# Patient Record
Sex: MALE | Race: WHITE
[De-identification: names, ages, dates, MRNs, and addresses within clinical notes are randomized per-mention and may not be internally consistent; named-entity substitution may affect disease eponyms.]

---

## 2020-09-27 ENCOUNTER — HOSPITAL ENCOUNTER (EMERGENCY)
Dept: HOSPITAL 57 - BURERS | Age: 70
LOS: 1 days | Discharge: ANOTHER HOSPITAL | End: 2020-09-28
Payer: MEDICARE

## 2020-09-27 DIAGNOSIS — E87.8: ICD-10-CM

## 2020-09-27 DIAGNOSIS — Z79.82: ICD-10-CM

## 2020-09-27 DIAGNOSIS — I50.9: ICD-10-CM

## 2020-09-27 DIAGNOSIS — F17.220: ICD-10-CM

## 2020-09-27 DIAGNOSIS — Z79.899: ICD-10-CM

## 2020-09-27 DIAGNOSIS — R06.00: Primary | ICD-10-CM

## 2020-09-27 DIAGNOSIS — I11.0: ICD-10-CM

## 2020-09-27 LAB
ALBUMIN SERPL BCG-MCNC: 3.4 G/DL (ref 3.4–4.8)
ALP SERPL-CCNC: 115 U/L (ref 40–110)
ALT SERPL W P-5'-P-CCNC: 103 U/L (ref 8–55)
ANION GAP SERPL CALC-SCNC: 16 MMOL/L (ref 10–20)
APAP SERPL-MCNC: (no result) MCG/ML (ref 10–30)
AST SERPL-CCNC: 122 U/L (ref 5–34)
BASOPHILS # BLD AUTO: 0.2 THOU/UL (ref 0–0.2)
BASOPHILS NFR BLD AUTO: 2 % (ref 0–1)
BILIRUB SERPL-MCNC: 1 MG/DL (ref 0.2–1.2)
BUN SERPL-MCNC: 10 MG/DL (ref 8.4–25.7)
CA-I BLDV-SCNC: 1.07 MMOL/L
CALCIUM SERPL-MCNC: 8.2 MG/DL (ref 7.8–10.44)
CHLORIDE BLDV-SCNC: 103 MMOL/L (ref 98–107)
CHLORIDE SERPL-SCNC: 102 MMOL/L (ref 98–107)
CO2 BLDV CALC-SCNC: 19.1 MMOL/L (ref 22–28)
CO2 SERPL-SCNC: 15 MMOL/L (ref 23–31)
CREAT CL PREDICTED SERPL C-G-VRATE: 0 ML/MIN (ref 70–130)
EOSINOPHIL # BLD AUTO: 0.3 THOU/UL (ref 0–0.7)
EOSINOPHIL NFR BLD AUTO: 3.7 % (ref 0–10)
GLOBULIN SER CALC-MCNC: 5.1 G/DL (ref 2.4–3.5)
GLUCOSE SERPL-MCNC: 95 MG/DL (ref 80–115)
HCO3 BLDV-SCNC: 18.2 MMOL/L (ref 22–28)
HCT VFR BLD CALC: 41 % (ref 42–52)
HGB BLD CALC-MCNC: 13.9 G/DL (ref 14–18)
HGB BLD-MCNC: 13.3 G/DL (ref 14–18)
LYMPHOCYTES NFR BLD AUTO: 42.1 % (ref 21–51)
MACROCYTES BLD QL SMEAR: (no result) (100X)
MCH RBC QN AUTO: 35.8 PG (ref 27–31)
MCV RBC AUTO: 109 FL (ref 78–98)
MDIFF COMPLETE?: YES
MONOCYTES # BLD AUTO: 1.1 THOU/UL (ref 0.11–0.59)
MONOCYTES NFR BLD AUTO: 13.3 % (ref 0–10)
NEUTROPHILS # BLD AUTO: 3.3 THOU/UL (ref 1.4–6.5)
NEUTROPHILS NFR BLD AUTO: 38.9 % (ref 42–75)
PCO2 BLDV: 29.9 MMHG (ref 40–50)
PLATELET # BLD AUTO: 118 THOU/UL (ref 130–400)
POTASSIUM BLDV-SCNC: 4.1 MMOL/L (ref 3.5–5.1)
POTASSIUM SERPL-SCNC: 4.2 MMOL/L (ref 3.5–5.1)
RBC # BLD AUTO: 3.71 MILL/UL (ref 4.7–6.1)
SALICYLATES SERPL-MCNC: (no result) MG/DL (ref 15–30)
SAO2 % BLDV FROM PO2: 95.8 % (ref 60–85)
SODIUM BLDV-SCNC: 131 MMOL/L (ref 138–145)
SODIUM SERPL-SCNC: 129 MMOL/L (ref 136–145)
WBC # BLD AUTO: 8.6 THOU/UL (ref 4.8–10.8)

## 2020-09-27 PROCEDURE — 83880 ASSAY OF NATRIURETIC PEPTIDE: CPT

## 2020-09-27 PROCEDURE — 71045 X-RAY EXAM CHEST 1 VIEW: CPT

## 2020-09-27 PROCEDURE — 83605 ASSAY OF LACTIC ACID: CPT

## 2020-09-27 PROCEDURE — 80053 COMPREHEN METABOLIC PANEL: CPT

## 2020-09-27 PROCEDURE — 85014 HEMATOCRIT: CPT

## 2020-09-27 PROCEDURE — 80307 DRUG TEST PRSMV CHEM ANLYZR: CPT

## 2020-09-27 PROCEDURE — 85025 COMPLETE CBC W/AUTO DIFF WBC: CPT

## 2020-09-27 PROCEDURE — 84484 ASSAY OF TROPONIN QUANT: CPT

## 2020-09-27 PROCEDURE — 93005 ELECTROCARDIOGRAM TRACING: CPT

## 2020-09-27 PROCEDURE — 82330 ASSAY OF CALCIUM: CPT

## 2020-09-27 PROCEDURE — 82803 BLOOD GASES ANY COMBINATION: CPT

## 2020-09-28 NOTE — RAD
PORTABLE CHEST:

 

DATE:  9/27/2020.

 

FINDINGS: 

An AP portable film at 2209 is compared with a 9/21/2011 study.

 

The AICD remains in place.  The heart is normal in size and the lungs are clear.  There is no congest
berry change or pleural effusion.  Calcification is seen in the aortic arch.

 

IMPRESSION: 

No acute thoracic finding.

 

POS: HOME